# Patient Record
Sex: FEMALE | Race: WHITE | URBAN - METROPOLITAN AREA
[De-identification: names, ages, dates, MRNs, and addresses within clinical notes are randomized per-mention and may not be internally consistent; named-entity substitution may affect disease eponyms.]

---

## 2022-03-18 PROBLEM — H60.513 ACUTE ACTINIC OTITIS EXTERNA OF BOTH EARS: Status: ACTIVE | Noted: 2021-12-28

## 2022-03-18 PROBLEM — J45.30 MILD PERSISTENT ASTHMA WITHOUT COMPLICATION: Status: ACTIVE | Noted: 2017-12-27

## 2022-03-18 PROBLEM — M48.061 SPINAL STENOSIS OF LUMBAR REGION WITHOUT NEUROGENIC CLAUDICATION: Status: ACTIVE | Noted: 2018-11-06

## 2022-03-19 PROBLEM — Z86.0100 HISTORY OF COLON POLYPS: Status: ACTIVE | Noted: 2019-06-27

## 2022-03-19 PROBLEM — G47.33 OBSTRUCTIVE SLEEP APNEA: Status: ACTIVE | Noted: 2020-02-18

## 2022-03-19 PROBLEM — Z86.010 HISTORY OF COLON POLYPS: Status: ACTIVE | Noted: 2019-06-27

## 2022-03-19 PROBLEM — N94.10 DYSPAREUNIA IN FEMALE: Status: ACTIVE | Noted: 2017-04-12

## 2022-03-19 PROBLEM — N95.2 VAGINAL ATROPHY: Status: ACTIVE | Noted: 2017-04-12

## 2022-03-19 PROBLEM — F51.01 PRIMARY INSOMNIA: Status: ACTIVE | Noted: 2017-12-27

## 2022-03-20 PROBLEM — J31.0 CHRONIC RHINITIS: Status: ACTIVE | Noted: 2017-12-27

## 2022-08-22 ENCOUNTER — OFFICE VISIT (OUTPATIENT)
Dept: OBGYN CLINIC | Age: 67
End: 2022-08-22
Payer: MEDICARE

## 2022-08-22 VITALS — SYSTOLIC BLOOD PRESSURE: 108 MMHG | DIASTOLIC BLOOD PRESSURE: 62 MMHG | BODY MASS INDEX: 22.66 KG/M2 | HEIGHT: 64 IN

## 2022-08-22 DIAGNOSIS — N83.8 OVARIAN MASS, RIGHT: Primary | ICD-10-CM

## 2022-08-22 DIAGNOSIS — N83.8 OTHER NONINFLAMMATORY DISORDERS OF OVARY, FALLOPIAN TUBE AND BROAD LIGAMENT: ICD-10-CM

## 2022-08-22 DIAGNOSIS — N83.201 CYST OF RIGHT OVARY: ICD-10-CM

## 2022-08-22 PROBLEM — J30.1 ALLERGIC RHINITIS DUE TO POLLEN: Status: ACTIVE | Noted: 2022-05-04

## 2022-08-22 PROBLEM — G89.29 CHRONIC RIGHT-SIDED LOW BACK PAIN WITH RIGHT-SIDED SCIATICA: Status: ACTIVE | Noted: 2018-09-27

## 2022-08-22 PROBLEM — M19.111 POST-TRAUMATIC OSTEOARTHRITIS OF RIGHT SHOULDER: Status: ACTIVE | Noted: 2022-07-18

## 2022-08-22 PROBLEM — M54.41 CHRONIC RIGHT-SIDED LOW BACK PAIN WITH RIGHT-SIDED SCIATICA: Status: ACTIVE | Noted: 2018-09-27

## 2022-08-22 PROBLEM — L01.00 IMPETIGO: Status: ACTIVE | Noted: 2022-02-15

## 2022-08-22 PROBLEM — D17.24 LIPOMA OF LEFT LOWER EXTREMITY: Status: ACTIVE | Noted: 2020-11-30

## 2022-08-22 PROBLEM — B35.1 ONYCHOMYCOSIS OF TOENAIL: Status: ACTIVE | Noted: 2022-07-18

## 2022-08-22 PROCEDURE — 76830 TRANSVAGINAL US NON-OB: CPT | Performed by: OBSTETRICS & GYNECOLOGY

## 2022-08-22 PROCEDURE — 99214 OFFICE O/P EST MOD 30 MIN: CPT | Performed by: OBSTETRICS & GYNECOLOGY

## 2022-08-22 PROCEDURE — G8428 CUR MEDS NOT DOCUMENT: HCPCS | Performed by: OBSTETRICS & GYNECOLOGY

## 2022-08-22 PROCEDURE — 1090F PRES/ABSN URINE INCON ASSESS: CPT | Performed by: OBSTETRICS & GYNECOLOGY

## 2022-08-22 PROCEDURE — G8420 CALC BMI NORM PARAMETERS: HCPCS | Performed by: OBSTETRICS & GYNECOLOGY

## 2022-08-22 PROCEDURE — 3017F COLORECTAL CA SCREEN DOC REV: CPT | Performed by: OBSTETRICS & GYNECOLOGY

## 2022-08-22 PROCEDURE — 4004F PT TOBACCO SCREEN RCVD TLK: CPT | Performed by: OBSTETRICS & GYNECOLOGY

## 2022-08-22 PROCEDURE — 1123F ACP DISCUSS/DSCN MKR DOCD: CPT | Performed by: OBSTETRICS & GYNECOLOGY

## 2022-08-22 PROCEDURE — G8400 PT W/DXA NO RESULTS DOC: HCPCS | Performed by: OBSTETRICS & GYNECOLOGY

## 2022-08-22 ASSESSMENT — PATIENT HEALTH QUESTIONNAIRE - PHQ9
SUM OF ALL RESPONSES TO PHQ QUESTIONS 1-9: 0
SUM OF ALL RESPONSES TO PHQ QUESTIONS 1-9: 0
SUM OF ALL RESPONSES TO PHQ9 QUESTIONS 1 & 2: 0
SUM OF ALL RESPONSES TO PHQ QUESTIONS 1-9: 0
SUM OF ALL RESPONSES TO PHQ QUESTIONS 1-9: 0
1. LITTLE INTEREST OR PLEASURE IN DOING THINGS: 0
2. FEELING DOWN, DEPRESSED OR HOPELESS: 0

## 2022-08-22 NOTE — PROGRESS NOTES
Jann Nur  is a 79 y.o. female, No obstetric history on file., No LMP recorded. Patient has had an ablation. ,  who is seen for ultrasound follow up. HISTORY:  Sexual History:  single partner, contraception - tubal ligation  Contraception:  tubal ligation  Current Outpatient Medications on File Prior to Visit   Medication Sig Dispense Refill    Multiple Vitamin (MULTIVITAMIN PO) Take by mouth      IBUPROFEN PO Take by mouth      CALCIUM-MAGNESIUM-ZINC PO Take by mouth      albuterol sulfate  (90 Base) MCG/ACT inhaler Inhale 2 puffs into the lungs as needed       No current facility-administered medications on file prior to visit. ROS:  Review of Gates Burkitt  has a past medical history of Anemia, Arthritis, Asthma, Calculus of kidney, Depression, Eczema, Environmental allergies, Fibroids, GERD (gastroesophageal reflux disease), Nausea & vomiting, Other ill-defined conditions(799.89), and Pneumonia. .    Previous surgeries include  has a past surgical history that includes back surgery; Endometrial ablation; Dilation and curettage of uterus; Tubal ligation; other surgical history; orthopedic surgery; cervical fusion; lumbar fusion; lap,tubal cautery; Rotator cuff repair (Right); and other surgical history (2011). .    Her current meds are   Current Outpatient Medications:     Multiple Vitamin (MULTIVITAMIN PO), Take by mouth, Disp: , Rfl:     IBUPROFEN PO, Take by mouth, Disp: , Rfl:     CALCIUM-MAGNESIUM-ZINC PO, Take by mouth, Disp: , Rfl:     albuterol sulfate  (90 Base) MCG/ACT inhaler, Inhale 2 puffs into the lungs as needed, Disp: , Rfl:      Family history is significant for family history includes Asthma in her father; COPD in her father; Diabetes in her mother; Emphysema in her father; Hypertension in her father; Post-op Nausea/Vomiting in her brother, father, and mother; Stroke in her father. .       PHYSICAL EXAM:  Blood pressure 108/62, height 5' 4\" (1.626 m).  OBGyn Exam   The patient appears well, alert, oriented x 3, in no distress. Ultrasound again shows unchanged right ovarian cyst along with a solid-appearing area in the right ovary also. These are essentially unchanged from her prior scans please see complete ultrasound under imaging. ASSESSMENT:  Encounter Diagnoses   Name Primary? Ovarian mass, right Yes    Cyst of right ovary     Other noninflammatory disorders of ovary, fallopian tube and broad ligament        PLAN:  We discussed with her these areas in the right ovary have an overall benign appearance but cannot be assured of that. They have not significantly increased in size and her 's have been normal.  Discussed consider continued observation versus proceeding on with surgical management. She states that she has become very concerned about this becoming something worse and is interested in proceeding on with removal for definitive diagnoses. Discussed removing the ovary and left tube versus bilateral ovaries versus hysterectomy bilateral ovaries after thorough discussion of all options decision was made to proceed on with right salpingo-oophorectomy and salpingectomy of the left. This can be done laparoscopically we discussed this in detail risk benefits alternatives were discussed along with the risk of surgery. We will get this scheduled and see her back for preoperative visit sooner if she has any problems or questions. Discussed risk of surgery: Bleeding, infection, injury to bowel, bladder, ureters, kidneys, intra abdominal or intra pelvic structures, nerves or blood vessels. Risk of anesthesia also discussed and questions answered. Also discussed options including no intervention. All questions answered.     Orders Placed This Encounter   Procedures    AMB POC US, TRANSVAGINAL     Order Specific Question:   Reason for Exam:     Answer:   [address]

## 2022-08-24 ENCOUNTER — TELEPHONE (OUTPATIENT)
Dept: OBGYN CLINIC | Age: 67
End: 2022-08-24

## 2022-09-09 ENCOUNTER — TELEPHONE (OUTPATIENT)
Dept: OBGYN CLINIC | Age: 67
End: 2022-09-09

## 2022-09-09 NOTE — TELEPHONE ENCOUNTER
She is calling to get surgery set up. I told her Stella Peers tried to call her to set this up, but no voice mail and home phone number not in service. She said she has gotten a new phone number, [phone]. I did update in her account.

## 2022-09-12 ENCOUNTER — PREP FOR PROCEDURE (OUTPATIENT)
Dept: OBGYN CLINIC | Age: 67
End: 2022-09-12

## 2022-10-17 ENCOUNTER — TELEPHONE (OUTPATIENT)
Dept: OBGYN CLINIC | Age: 67
End: 2022-10-17

## 2022-10-19 ENCOUNTER — TELEPHONE (OUTPATIENT)
Dept: OBGYN CLINIC | Age: 67
End: 2022-10-19

## 2022-10-19 NOTE — TELEPHONE ENCOUNTER
She wants to cancel her surgery. She applied for financial aid and had not heard anything so she called them and they have not started to process her application yet. She will let us know when she wants to reschedule the surgery.

## 2023-04-28 ENCOUNTER — TELEPHONE (OUTPATIENT)
Dept: OBGYN CLINIC | Age: 68
End: 2023-04-28

## 2023-04-28 NOTE — TELEPHONE ENCOUNTER
Per  [facility], ok to bring in for ultrasound. Called pt, left message to call back to schedule an ultrasound.

## 2023-04-28 NOTE — TELEPHONE ENCOUNTER
She called to make an appointment. She said she has \"tumors\" we have been checking on and thinks it is time to get those checked again. Does she need an ultrasound or just office visit.

## 2023-05-15 ENCOUNTER — OFFICE VISIT (OUTPATIENT)
Dept: OBGYN CLINIC | Age: 68
End: 2023-05-15
Payer: MEDICARE

## 2023-05-15 VITALS
BODY MASS INDEX: 21.65 KG/M2 | HEIGHT: 64 IN | SYSTOLIC BLOOD PRESSURE: 122 MMHG | WEIGHT: 126.8 LBS | DIASTOLIC BLOOD PRESSURE: 78 MMHG

## 2023-05-15 DIAGNOSIS — N83.201 CYST OF RIGHT OVARY: ICD-10-CM

## 2023-05-15 DIAGNOSIS — N83.8 OVARIAN MASS, RIGHT: Primary | ICD-10-CM

## 2023-05-15 PROCEDURE — G8428 CUR MEDS NOT DOCUMENT: HCPCS | Performed by: OBSTETRICS & GYNECOLOGY

## 2023-05-15 PROCEDURE — 1036F TOBACCO NON-USER: CPT | Performed by: OBSTETRICS & GYNECOLOGY

## 2023-05-15 PROCEDURE — 3017F COLORECTAL CA SCREEN DOC REV: CPT | Performed by: OBSTETRICS & GYNECOLOGY

## 2023-05-15 PROCEDURE — G8420 CALC BMI NORM PARAMETERS: HCPCS | Performed by: OBSTETRICS & GYNECOLOGY

## 2023-05-15 PROCEDURE — 1123F ACP DISCUSS/DSCN MKR DOCD: CPT | Performed by: OBSTETRICS & GYNECOLOGY

## 2023-05-15 PROCEDURE — 76830 TRANSVAGINAL US NON-OB: CPT | Performed by: OBSTETRICS & GYNECOLOGY

## 2023-05-15 PROCEDURE — 1090F PRES/ABSN URINE INCON ASSESS: CPT | Performed by: OBSTETRICS & GYNECOLOGY

## 2023-05-15 PROCEDURE — G8400 PT W/DXA NO RESULTS DOC: HCPCS | Performed by: OBSTETRICS & GYNECOLOGY

## 2023-05-15 PROCEDURE — 99212 OFFICE O/P EST SF 10 MIN: CPT | Performed by: OBSTETRICS & GYNECOLOGY

## 2023-05-15 ASSESSMENT — PATIENT HEALTH QUESTIONNAIRE - PHQ9
SUM OF ALL RESPONSES TO PHQ QUESTIONS 1-9: 0
2. FEELING DOWN, DEPRESSED OR HOPELESS: 0
SUM OF ALL RESPONSES TO PHQ9 QUESTIONS 1 & 2: 0
1. LITTLE INTEREST OR PLEASURE IN DOING THINGS: 0
SUM OF ALL RESPONSES TO PHQ QUESTIONS 1-9: 0

## 2023-05-15 NOTE — PROGRESS NOTES
Aislinn Mccarty  is a 79 y.o. female, No obstetric history on file., No LMP recorded. Patient has had an ablation. ,  who is seen for ultrasound follow up for right ovarian mass. She has been having vaginal spotting over the past few months. She had COVID, then flu, then pneumonia. She states that she usually feels \"like you're going to get your period\" describes this as a heaviness and pressure. Usually only seeing spotting when wiping. She has been tested for UTI and was negative several months ago. US Findings:  Cx appears wnl  Uterus is small, anteverted, and heterogeneous  Endo=1.6 mm, no intracavitary masses visualized  ROV visualized with solid appearing mass measuring 1.9 x 1.2 x 1.5 cm (previous 2.2 x 1.3 x 1.6 cm). There is also a simple cyst measuring 1.1 x 0.9 x 0.8 cm (previous 1.4 x 0.9 x 0.9 cm) these areas appear to be unchanged. RT AdN appears wnl  LOV visualized and appears wnl  Increased vascularity in LT adnexa     HISTORY:    Current Outpatient Medications on File Prior to Visit   Medication Sig Dispense Refill    Chlorpheniramine Maleate (ALLERGY PO) Take by mouth      Multiple Vitamin (MULTIVITAMIN PO) Take by mouth      IBUPROFEN PO Take by mouth      CALCIUM-MAGNESIUM-ZINC PO Take by mouth      albuterol sulfate  (90 Base) MCG/ACT inhaler Inhale 2 puffs into the lungs as needed       No current facility-administered medications on file prior to visit. ROS:  Review of [place] Jonancy  has a past medical history of Anemia, Arthritis, Asthma, Calculus of kidney, Depression, Eczema, Environmental allergies, Fibroids, GERD (gastroesophageal reflux disease), Nausea & vomiting, Other ill-defined conditions(799.89), and Pneumonia. .    Previous surgeries include  has a past surgical history that includes back surgery; Endometrial ablation; Dilation and curettage of uterus;  Tubal ligation; other surgical history; orthopedic surgery; cervical fusion; lumbar fusion; lap,tubal cautery;

## 2023-12-13 ENCOUNTER — OFFICE VISIT (OUTPATIENT)
Dept: OBGYN CLINIC | Age: 68
End: 2023-12-13
Payer: MEDICARE

## 2023-12-13 VITALS
WEIGHT: 123.66 LBS | HEIGHT: 64 IN | SYSTOLIC BLOOD PRESSURE: 116 MMHG | BODY MASS INDEX: 21.11 KG/M2 | DIASTOLIC BLOOD PRESSURE: 74 MMHG

## 2023-12-13 DIAGNOSIS — N83.8 OVARIAN MASS, RIGHT: Primary | ICD-10-CM

## 2023-12-13 PROCEDURE — G8400 PT W/DXA NO RESULTS DOC: HCPCS | Performed by: OBSTETRICS & GYNECOLOGY

## 2023-12-13 PROCEDURE — 1123F ACP DISCUSS/DSCN MKR DOCD: CPT | Performed by: OBSTETRICS & GYNECOLOGY

## 2023-12-13 PROCEDURE — 76830 TRANSVAGINAL US NON-OB: CPT | Performed by: OBSTETRICS & GYNECOLOGY

## 2023-12-13 PROCEDURE — 1090F PRES/ABSN URINE INCON ASSESS: CPT | Performed by: OBSTETRICS & GYNECOLOGY

## 2023-12-13 PROCEDURE — G8427 DOCREV CUR MEDS BY ELIG CLIN: HCPCS | Performed by: OBSTETRICS & GYNECOLOGY

## 2023-12-13 PROCEDURE — G8484 FLU IMMUNIZE NO ADMIN: HCPCS | Performed by: OBSTETRICS & GYNECOLOGY

## 2023-12-13 PROCEDURE — 99212 OFFICE O/P EST SF 10 MIN: CPT | Performed by: OBSTETRICS & GYNECOLOGY

## 2023-12-13 PROCEDURE — G8420 CALC BMI NORM PARAMETERS: HCPCS | Performed by: OBSTETRICS & GYNECOLOGY

## 2023-12-13 PROCEDURE — 3017F COLORECTAL CA SCREEN DOC REV: CPT | Performed by: OBSTETRICS & GYNECOLOGY

## 2023-12-13 PROCEDURE — 1036F TOBACCO NON-USER: CPT | Performed by: OBSTETRICS & GYNECOLOGY

## 2023-12-13 NOTE — PROGRESS NOTES
GYN US
CALCIUM-MAGNESIUM-ZINC PO, Take by mouth, Disp: , Rfl:     albuterol sulfate  (90 Base) MCG/ACT inhaler, Inhale 2 puffs into the lungs as needed, Disp: , Rfl:      Family history is significant for family history includes Arrhythmia in her mother; Asthma in her father; COPD in her father; Diabetes in her mother; Emphysema in her father; Hypertension in her father; Post-op Nausea/Vomiting in her brother, father, and mother; Stroke in her father. .       PHYSICAL EXAM:  Blood pressure 116/74, height 1.626 m (5' 4\"), weight 56.1 kg (123 lb 10.6 oz). OBGyn Exam     ASSESSMENT:  Encounter Diagnosis   Name Primary? Ovarian mass, right Yes       PLAN:  Discussed with her that her mass is very stable. He continues to have benign appearance and therefore discussed potentially continued observation she is interested in spacing out to yearly I think that is reasonable. Did discuss with her again that we cannot be certain but that not see anything that necessitates removal at this time.   Orders Placed This Encounter   Procedures    AMB [facility] Yaniv Arriaza

## 2024-06-11 ENCOUNTER — OFFICE VISIT (OUTPATIENT)
Dept: OBGYN CLINIC | Age: 69
End: 2024-06-11
Payer: MEDICARE

## 2024-06-11 VITALS
BODY MASS INDEX: 20.45 KG/M2 | DIASTOLIC BLOOD PRESSURE: 70 MMHG | HEIGHT: 64 IN | SYSTOLIC BLOOD PRESSURE: 124 MMHG | WEIGHT: 119.8 LBS

## 2024-06-11 DIAGNOSIS — N95.2 VAGINAL ATROPHY: ICD-10-CM

## 2024-06-11 DIAGNOSIS — N34.2 URETHRITIS: Primary | ICD-10-CM

## 2024-06-11 PROCEDURE — G8400 PT W/DXA NO RESULTS DOC: HCPCS | Performed by: OBSTETRICS & GYNECOLOGY

## 2024-06-11 PROCEDURE — 1090F PRES/ABSN URINE INCON ASSESS: CPT | Performed by: OBSTETRICS & GYNECOLOGY

## 2024-06-11 PROCEDURE — 99214 OFFICE O/P EST MOD 30 MIN: CPT | Performed by: OBSTETRICS & GYNECOLOGY

## 2024-06-11 PROCEDURE — G8420 CALC BMI NORM PARAMETERS: HCPCS | Performed by: OBSTETRICS & GYNECOLOGY

## 2024-06-11 PROCEDURE — G8427 DOCREV CUR MEDS BY ELIG CLIN: HCPCS | Performed by: OBSTETRICS & GYNECOLOGY

## 2024-06-11 PROCEDURE — 3017F COLORECTAL CA SCREEN DOC REV: CPT | Performed by: OBSTETRICS & GYNECOLOGY

## 2024-06-11 PROCEDURE — 1036F TOBACCO NON-USER: CPT | Performed by: OBSTETRICS & GYNECOLOGY

## 2024-06-11 PROCEDURE — 1123F ACP DISCUSS/DSCN MKR DOCD: CPT | Performed by: OBSTETRICS & GYNECOLOGY

## 2024-06-11 RX ORDER — ESTRADIOL 0.1 MG/G
1 CREAM VAGINAL DAILY
Qty: 1 EACH | Refills: 0 | Status: SHIPPED | OUTPATIENT
Start: 2024-06-11

## 2024-06-11 ASSESSMENT — PATIENT HEALTH QUESTIONNAIRE - PHQ9
SUM OF ALL RESPONSES TO PHQ QUESTIONS 1-9: 0
2. FEELING DOWN, DEPRESSED OR HOPELESS: NOT AT ALL
SUM OF ALL RESPONSES TO PHQ QUESTIONS 1-9: 0
SUM OF ALL RESPONSES TO PHQ QUESTIONS 1-9: 0
SUM OF ALL RESPONSES TO PHQ9 QUESTIONS 1 & 2: 0
1. LITTLE INTEREST OR PLEASURE IN DOING THINGS: NOT AT ALL
SUM OF ALL RESPONSES TO PHQ QUESTIONS 1-9: 0

## 2024-06-11 NOTE — PROGRESS NOTES
Albert  is a 68 y.o. female, , No LMP recorded. Patient has had an ablation.,  who is seen for vaginal bleeding, when she wipes and during foreplay.  Bleeding is sporadic.  Onset was 2 weeks ago.  She was seen by her PCP to rule out UTI, which she was negative for. She has not noticed any tears in the vaginal area.       Current Outpatient Medications on File Prior to Visit   Medication Sig Dispense Refill    Multiple Vitamin (MULTIVITAMIN PO) Take by mouth      IBUPROFEN PO Take by mouth      CALCIUM-MAGNESIUM-ZINC PO Take by mouth      albuterol sulfate  (90 Base) MCG/ACT inhaler Inhale 2 puffs into the lungs as needed      Chlorpheniramine Maleate (ALLERGY PO) Take by mouth (Patient not taking: Reported on 2024)       No current facility-administered medications on file prior to visit.       ROS:  Review of Systems     Albert  has a past medical history of Anemia, Arthritis, Asthma, Calculus of kidney, Depression, Eczema, Environmental allergies, Fibroids, GERD (gastroesophageal reflux disease), Nausea & vomiting, Other ill-defined conditions(799.89), and Pneumonia. .    Previous surgeries include  has a past surgical history that includes back surgery; Endometrial ablation; Dilation and curettage of uterus; Tubal ligation; other surgical history; orthopedic surgery; cervical fusion; lumbar fusion; lap,tubal cautery; Rotator cuff repair (Right); and other surgical history ()..    Her current meds are   Current Outpatient Medications:     estradiol (ESTRACE VAGINAL) 0.1 MG/GM vaginal cream, Place 1 g vaginally daily, Disp: 1 each, Rfl: 0    Multiple Vitamin (MULTIVITAMIN PO), Take by mouth, Disp: , Rfl:     IBUPROFEN PO, Take by mouth, Disp: , Rfl:     CALCIUM-MAGNESIUM-ZINC PO, Take by mouth, Disp: , Rfl:     albuterol sulfate  (90 Base) MCG/ACT inhaler, Inhale 2 puffs into the lungs as needed, Disp: , Rfl:     Chlorpheniramine Maleate (ALLERGY PO), Take by mouth (Patient not taking:

## 2025-06-18 ENCOUNTER — OFFICE VISIT (OUTPATIENT)
Dept: OBGYN CLINIC | Age: 70
End: 2025-06-18
Payer: MEDICARE

## 2025-06-18 VITALS
BODY MASS INDEX: 20.35 KG/M2 | DIASTOLIC BLOOD PRESSURE: 76 MMHG | WEIGHT: 119.2 LBS | HEIGHT: 64 IN | SYSTOLIC BLOOD PRESSURE: 122 MMHG

## 2025-06-18 DIAGNOSIS — N83.8 OVARIAN MASS, RIGHT: ICD-10-CM

## 2025-06-18 DIAGNOSIS — N34.2 URETHRITIS: Primary | ICD-10-CM

## 2025-06-18 DIAGNOSIS — R10.2 PELVIC PAIN IN FEMALE: ICD-10-CM

## 2025-06-18 DIAGNOSIS — N95.2 VAGINAL ATROPHY: ICD-10-CM

## 2025-06-18 PROCEDURE — G8420 CALC BMI NORM PARAMETERS: HCPCS | Performed by: OBSTETRICS & GYNECOLOGY

## 2025-06-18 PROCEDURE — 99459 PELVIC EXAMINATION: CPT | Performed by: OBSTETRICS & GYNECOLOGY

## 2025-06-18 PROCEDURE — G8427 DOCREV CUR MEDS BY ELIG CLIN: HCPCS | Performed by: OBSTETRICS & GYNECOLOGY

## 2025-06-18 PROCEDURE — G8400 PT W/DXA NO RESULTS DOC: HCPCS | Performed by: OBSTETRICS & GYNECOLOGY

## 2025-06-18 PROCEDURE — 99214 OFFICE O/P EST MOD 30 MIN: CPT | Performed by: OBSTETRICS & GYNECOLOGY

## 2025-06-18 PROCEDURE — 1123F ACP DISCUSS/DSCN MKR DOCD: CPT | Performed by: OBSTETRICS & GYNECOLOGY

## 2025-06-18 PROCEDURE — 4004F PT TOBACCO SCREEN RCVD TLK: CPT | Performed by: OBSTETRICS & GYNECOLOGY

## 2025-06-18 PROCEDURE — 1090F PRES/ABSN URINE INCON ASSESS: CPT | Performed by: OBSTETRICS & GYNECOLOGY

## 2025-06-18 PROCEDURE — 1159F MED LIST DOCD IN RCRD: CPT | Performed by: OBSTETRICS & GYNECOLOGY

## 2025-06-18 PROCEDURE — 3017F COLORECTAL CA SCREEN DOC REV: CPT | Performed by: OBSTETRICS & GYNECOLOGY

## 2025-06-18 RX ORDER — OXYCODONE HYDROCHLORIDE 10 MG/1
10 TABLET ORAL EVERY 8 HOURS PRN
COMMUNITY
Start: 2025-06-09

## 2025-06-18 RX ORDER — HYDROCODONE BITARTRATE AND ACETAMINOPHEN 10; 325 MG/1; MG/1
1-2 TABLET ORAL EVERY 4 HOURS PRN
COMMUNITY
Start: 2024-09-20 | End: 2025-06-18

## 2025-06-18 RX ORDER — TRAZODONE HYDROCHLORIDE 100 MG/1
100 TABLET ORAL NIGHTLY
COMMUNITY

## 2025-06-18 RX ORDER — ESTRADIOL 0.1 MG/G
1 CREAM VAGINAL
Qty: 1 EACH | Refills: 4 | Status: SHIPPED | OUTPATIENT
Start: 2025-06-19

## 2025-06-18 ASSESSMENT — PATIENT HEALTH QUESTIONNAIRE - PHQ9
SUM OF ALL RESPONSES TO PHQ QUESTIONS 1-9: 1
2. FEELING DOWN, DEPRESSED OR HOPELESS: SEVERAL DAYS
1. LITTLE INTEREST OR PLEASURE IN DOING THINGS: NOT AT ALL
SUM OF ALL RESPONSES TO PHQ QUESTIONS 1-9: 1

## 2025-06-18 NOTE — PROGRESS NOTES
Albert  is a 69 y.o. female, , No LMP recorded. Patient has had an ablation.,  who is seen for vaginal pressure on the right hand side. She is awaiting back surgery so she does have pain radiating down her legs. Symptoms did improve when she used hormonal vaginal cream, however, she then began noticing a vaginal discharge. White in color without odor. She states that the discharge is constantly there and she is damp, has to wear a panty liner. Recently had right shoulder replacement in Fall  so she had to postpone back surgery. Then had a right shoulder fracture.       HISTORY:  Sexual History:  single partner, contraception - post menopausal status    Current Outpatient Medications on File Prior to Visit   Medication Sig Dispense Refill    oxyCODONE HCl (OXY-IR) 10 MG immediate release tablet Take 1 tablet by mouth every 8 hours as needed.      estradiol (ESTRACE VAGINAL) 0.1 MG/GM vaginal cream Place 1 g vaginally daily 1 each 0    IBUPROFEN PO Take by mouth      CALCIUM-MAGNESIUM-ZINC PO Take by mouth      albuterol sulfate  (90 Base) MCG/ACT inhaler Inhale 2 puffs into the lungs as needed      traZODone (DESYREL) 100 MG tablet Take 1 tablet by mouth nightly      Chlorpheniramine Maleate (ALLERGY PO) Take by mouth (Patient not taking: Reported on 2025)      Multiple Vitamin (MULTIVITAMIN PO) Take by mouth       No current facility-administered medications on file prior to visit.       ROS:  Review of Systems     Albert  has a past medical history of Anemia, Arthritis, Asthma, Calculus of kidney, Depression, Eczema, Environmental allergies, Fibroids, GERD (gastroesophageal reflux disease), Nausea & vomiting, Other ill-defined conditions(799.89), and Pneumonia. .    Previous surgeries include  has a past surgical history that includes back surgery; Endometrial ablation; Dilation and curettage of uterus; Tubal ligation; other surgical history; orthopedic surgery; cervical fusion; lumbar

## 2025-06-24 ENCOUNTER — RESULTS FOLLOW-UP (OUTPATIENT)
Dept: OBGYN CLINIC | Age: 70
End: 2025-06-24

## 2025-06-24 ENCOUNTER — PROCEDURE VISIT (OUTPATIENT)
Dept: OBGYN CLINIC | Age: 70
End: 2025-06-24
Payer: MEDICARE

## 2025-06-24 DIAGNOSIS — N83.8 OVARIAN MASS, RIGHT: Primary | ICD-10-CM

## 2025-06-24 DIAGNOSIS — N83.201 CYST OF RIGHT OVARY: ICD-10-CM

## 2025-06-24 PROCEDURE — 76830 TRANSVAGINAL US NON-OB: CPT | Performed by: OBSTETRICS & GYNECOLOGY

## 2025-06-25 ENCOUNTER — TELEPHONE (OUTPATIENT)
Dept: OBGYN CLINIC | Age: 70
End: 2025-06-25